# Patient Record
Sex: MALE | Race: BLACK OR AFRICAN AMERICAN | Employment: UNEMPLOYED | URBAN - METROPOLITAN AREA
[De-identification: names, ages, dates, MRNs, and addresses within clinical notes are randomized per-mention and may not be internally consistent; named-entity substitution may affect disease eponyms.]

---

## 2020-09-23 ENCOUNTER — VIRTUAL VISIT (OUTPATIENT)
Dept: FAMILY MEDICINE CLINIC | Age: 50
End: 2020-09-23
Payer: COMMERCIAL

## 2020-09-23 DIAGNOSIS — E11.65 TYPE 2 DIABETES MELLITUS WITH HYPERGLYCEMIA, WITHOUT LONG-TERM CURRENT USE OF INSULIN (HCC): Primary | ICD-10-CM

## 2020-09-23 DIAGNOSIS — E03.9 HYPOTHYROIDISM, UNSPECIFIED TYPE: ICD-10-CM

## 2020-09-23 PROCEDURE — 99202 OFFICE O/P NEW SF 15 MIN: CPT | Performed by: FAMILY MEDICINE

## 2020-09-23 RX ORDER — METFORMIN HYDROCHLORIDE 500 MG/1
500 TABLET ORAL
COMMUNITY
End: 2020-09-23 | Stop reason: SDUPTHER

## 2020-09-23 RX ORDER — LEVOTHYROXINE SODIUM 150 UG/1
150 TABLET ORAL
Qty: 30 TAB | Refills: 2 | Status: SHIPPED | OUTPATIENT
Start: 2020-09-23 | End: 2021-02-23 | Stop reason: SDUPTHER

## 2020-09-23 RX ORDER — LEVOTHYROXINE SODIUM 150 UG/1
150 TABLET ORAL
COMMUNITY
End: 2020-09-23 | Stop reason: SDUPTHER

## 2020-09-23 RX ORDER — METFORMIN HYDROCHLORIDE 500 MG/1
500 TABLET ORAL
Qty: 30 TAB | Refills: 2 | Status: SHIPPED | OUTPATIENT
Start: 2020-09-23 | End: 2021-01-11

## 2020-09-23 NOTE — PROGRESS NOTES
Brandie Yarbrough is a 48 y.o. male who was seen by synchronous (real-time) audio-video technology on 9/23/2020 for Diabetes (he is establishing care. he has no complaints. he is recent out of [facility]. ) and Thyroid Problem        Assessment & Plan:   Diagnoses and all orders for this visit:    1. Type 2 diabetes mellitus with hyperglycemia, without long-term current use of insulin (HCC)  -     metFORMIN (GLUCOPHAGE) 500 mg tablet; Take 1 Tab by mouth daily (with breakfast). Indications: type 2 diabetes mellitus  -     HEMOGLOBIN A1C WITH EAG; Future  -     METABOLIC PANEL, COMPREHENSIVE; Future  -     CBC WITH AUTOMATED DIFF; Future  -     LIPID PANEL; Future    2. Hypothyroidism, unspecified type  -     levothyroxine (SYNTHROID) 150 mcg tablet; Take 1 Tab by mouth Daily (before breakfast). -     TSH AND FREE T4; Future          712  Subjective:       Prior to Admission medications    Medication Sig Start Date End Date Taking? Authorizing Provider   levothyroxine (SYNTHROID) 150 mcg tablet Take 150 mcg by mouth Daily (before breakfast). Yes Provider, Historical   metFORMIN (GLUCOPHAGE) 500 mg tablet Take 500 mg by mouth daily (with breakfast). Indications: type 2 diabetes mellitus   Yes Provider, Historical     There is no problem list on file for this patient. Past Medical History:   Diagnosis Date    Acquired underactive thyroid     DM (diabetes mellitus), type 2 ([facility] [address]        Review of Systems   Constitutional: Negative for chills, fever, malaise/fatigue and weight loss. Eyes: Negative for blurred vision. Respiratory: Negative for shortness of breath and wheezing. Cardiovascular: Negative for chest pain. Gastrointestinal: Negative for nausea and vomiting. Musculoskeletal: Negative for myalgias. Skin: Negative for rash. Neurological: Negative for weakness. Objective:   No flowsheet data found.    General: alert, cooperative, no distress   Mental  status: normal mood, behavior, speech, dress, motor activity, and thought processes, able to follow commands   HENT: NCAT   Neck: no visualized mass   Resp: no respiratory distress   Neuro: no gross deficits   Skin: no discoloration or lesions of concern on visible areas   Psychiatric: normal affect, consistent with stated mood, no evidence of hallucinations     Additional exam findings: We discussed the expected course, resolution and complications of the diagnosis(es) in detail. Medication risks, benefits, costs, interactions, and alternatives were discussed as indicated. I advised him to contact the office if his condition worsens, changes or fails to improve as anticipated. He expressed understanding with the diagnosis(es) and plan. Griselda Coward, who was evaluated through a patient-initiated, synchronous (real-time) audio-video encounter, and/or his healthcare decision maker, is aware that it is a billable service, with coverage as determined by his insurance carrier. He provided verbal consent to proceed: Yes, and patient identification was verified. It was conducted pursuant to the emergency declaration under the [address] [place] [address] authority and the [employer] General Act. A caregiver was present when appropriate. Ability to conduct physical exam was limited. I was in the office. The patient was at home.       Roberto Kimball MD

## 2020-09-23 NOTE — PROGRESS NOTES
Pt is a virtual visit for DM and thyroid. Hx of DM2 and hypothyroidism. Pt needs refills. meds reviewed and uploaded. Allergies, hx, questionnaires, hm all reviewed with patient.

## 2021-01-11 DIAGNOSIS — E11.65 TYPE 2 DIABETES MELLITUS WITH HYPERGLYCEMIA, WITHOUT LONG-TERM CURRENT USE OF INSULIN (HCC): ICD-10-CM

## 2021-01-11 RX ORDER — METFORMIN HYDROCHLORIDE 500 MG/1
TABLET ORAL
Qty: 30 TAB | Refills: 0 | Status: SHIPPED | OUTPATIENT
Start: 2021-01-11 | End: 2021-02-23 | Stop reason: SDUPTHER

## 2021-02-18 ENCOUNTER — TELEPHONE (OUTPATIENT)
Dept: FAMILY MEDICINE CLINIC | Age: 51
End: 2021-02-18

## 2021-02-18 NOTE — TELEPHONE ENCOUNTER
I called patient b/c he is overdue for labs and f/u. He states he has moved to [facility]. He was given the # to [facility] to f/u with them which is much closer to him.

## 2021-02-23 ENCOUNTER — VIRTUAL VISIT (OUTPATIENT)
Dept: FAMILY MEDICINE CLINIC | Age: 51
End: 2021-02-23
Payer: COMMERCIAL

## 2021-02-23 DIAGNOSIS — E03.9 HYPOTHYROIDISM, UNSPECIFIED TYPE: ICD-10-CM

## 2021-02-23 DIAGNOSIS — E11.65 TYPE 2 DIABETES MELLITUS WITH HYPERGLYCEMIA, WITHOUT LONG-TERM CURRENT USE OF INSULIN (HCC): ICD-10-CM

## 2021-02-23 PROCEDURE — 99212 OFFICE O/P EST SF 10 MIN: CPT | Performed by: FAMILY MEDICINE

## 2021-02-23 RX ORDER — METFORMIN HYDROCHLORIDE 500 MG/1
TABLET ORAL
Qty: 30 TAB | Refills: 5 | Status: SHIPPED | OUTPATIENT
Start: 2021-02-23 | End: 2021-05-24 | Stop reason: SDUPTHER

## 2021-02-23 RX ORDER — LEVOTHYROXINE SODIUM 150 UG/1
150 TABLET ORAL
Qty: 30 TAB | Refills: 5 | Status: SHIPPED | OUTPATIENT
Start: 2021-02-23 | End: 2021-05-24 | Stop reason: SDUPTHER

## 2021-02-23 NOTE — PROGRESS NOTES
Chief Complaint   Patient presents with    Thyroid Problem    Diabetes     Pt VV for chronic med conditions and med refills. 1. Have you been to the ER, urgent care clinic since your last visit? Hospitalized since your last visit? No    2. Have you seen or consulted any other health care providers outside of the [address] [place] [address] since your last visit? Include any pap smears or colon screening.  No

## 2021-02-23 NOTE — PROGRESS NOTES
Jeannine Mae is a 48 y.o. male, evaluated via audio-only technology on 2/23/2021 for Thyroid Problem and Diabetes  . Assessment & Plan:   Diagnoses and all orders for this visit:    1. Type 2 diabetes mellitus with hyperglycemia, without long-term current use of insulin (HCC)  -     metFORMIN (GLUCOPHAGE) 500 mg tablet; Take 1 tablet by mouth once daily with breakfast    2. Hypothyroidism, unspecified type  -     levothyroxine (SYNTHROID) 150 mcg tablet; Take 1 Tab by mouth Daily (before breakfast). 12  Subjective:       Prior to Admission medications    Medication Sig Start Date End Date Taking? Authorizing Provider   metFORMIN (GLUCOPHAGE) 500 mg tablet Take 1 tablet by mouth once daily with breakfast 2/23/21  Yes Kam Patterson MD   levothyroxine (SYNTHROID) 150 mcg tablet Take 1 Tab by mouth Daily (before breakfast). 2/23/21  Yes Kam Patterson MD   metFORMIN (GLUCOPHAGE) 500 mg tablet Take 1 tablet by mouth once daily with breakfast 1/11/21 2/23/21  Kam Patterson MD   levothyroxine (SYNTHROID) 150 mcg tablet Take 1 Tab by mouth Daily (before breakfast). 9/23/20 2/23/21  Kam Patterson MD     There is no problem list on file for this patient. Past Medical History:   Diagnosis Date    Acquired underactive thyroid     DM (diabetes mellitus), type 2 ([facility] [address]        Review of Systems   Eyes: Negative for blurred vision and double vision. Respiratory: Negative for cough and shortness of breath. Cardiovascular: Negative for chest pain and palpitations. Neurological: Negative. Psychiatric/Behavioral: Negative. No flowsheet data found. Jeannine Mae, who was evaluated through a patient-initiated, synchronous (real-time) audio only encounter, and/or her healthcare decision maker, is aware that it is a billable service, with coverage as determined by his insurance carrier. He provided verbal consent to proceed: Yes.  He has not had a related appointment within my department in the past 7 days or scheduled within the next 24 hours.       Total Time: minutes: 11-20 minutes    Jenelle Ellis MD

## 2021-05-24 ENCOUNTER — VIRTUAL VISIT (OUTPATIENT)
Dept: FAMILY MEDICINE CLINIC | Age: 51
End: 2021-05-24

## 2021-05-24 DIAGNOSIS — E03.9 HYPOTHYROIDISM, UNSPECIFIED TYPE: ICD-10-CM

## 2021-05-24 DIAGNOSIS — E11.65 TYPE 2 DIABETES MELLITUS WITH HYPERGLYCEMIA, WITHOUT LONG-TERM CURRENT USE OF INSULIN (HCC): Primary | ICD-10-CM

## 2021-05-24 DIAGNOSIS — Z12.5 PROSTATE CANCER SCREENING: ICD-10-CM

## 2021-05-24 PROCEDURE — 99214 OFFICE O/P EST MOD 30 MIN: CPT | Performed by: FAMILY MEDICINE

## 2021-05-24 RX ORDER — METFORMIN HYDROCHLORIDE 500 MG/1
TABLET ORAL
Qty: 30 TABLET | Refills: 0 | Status: SHIPPED | OUTPATIENT
Start: 2021-05-24 | End: 2022-01-11 | Stop reason: SDUPTHER

## 2021-05-24 RX ORDER — LEVOTHYROXINE SODIUM 150 UG/1
150 TABLET ORAL
Qty: 30 TABLET | Refills: 0 | Status: SHIPPED | OUTPATIENT
Start: 2021-05-24 | End: 2021-11-15

## 2021-05-24 NOTE — PROGRESS NOTES
Chief Complaint   Patient presents with    Diabetes    Thyroid Problem     Pt f/u for chronic conditions. Needs med refills. 1. Have you been to the ER, urgent care clinic since your last visit? Hospitalized since your last visit? No    2. Have you seen or consulted any other health care providers outside of the [address] [place] [address] since your last visit? Include any pap smears or colon screening.  No

## 2021-05-24 NOTE — PROGRESS NOTES
Rehan Schroeder is a 48 y.o. male who was seen by synchronous (real-time) audio-video technology on 5/24/2021 for Diabetes and Thyroid Problem  needs refills. No new complaints  Needs labs. Assessment & Plan:   Diagnoses and all orders for this visit:    1. Type 2 diabetes mellitus with hyperglycemia, without long-term current use of insulin (HCC)  -     metFORMIN (GLUCOPHAGE) 500 mg tablet; Take 1 tablet by mouth once daily with breakfast  -     LIPID PANEL; Future  -     METABOLIC PANEL, COMPREHENSIVE; Future  -     CBC WITH AUTOMATED DIFF; Future    2. Hypothyroidism, unspecified type  -     levothyroxine (SYNTHROID) 150 mcg tablet; Take 1 Tablet by mouth Daily (before breakfast). -     TSH AND FREE T4; Future    3. Prostate cancer screening  -     PSA SCREENING (SCREENING); Future          712  Subjective:       Prior to Admission medications    Medication Sig Start Date End Date Taking? Authorizing Provider   metFORMIN (GLUCOPHAGE) 500 mg tablet Take 1 tablet by mouth once daily with breakfast 2/23/21   Nicky Harry MD   levothyroxine (SYNTHROID) 150 mcg tablet Take 1 Tab by mouth Daily (before breakfast). 2/23/21   Nicky Harry MD     There is no problem list on file for this patient. Past Medical History:   Diagnosis Date    Acquired underactive thyroid     DM (diabetes mellitus), type 2 ([facility] [address]        Review of Systems   Constitutional: Negative for chills, fever, malaise/fatigue and weight loss. Eyes: Negative for blurred vision. Respiratory: Negative for cough, shortness of breath and wheezing. Cardiovascular: Negative for chest pain. Gastrointestinal: Negative for nausea and vomiting. Musculoskeletal: Negative for myalgias. Skin: Negative for rash. Neurological: Negative for weakness. Objective:   No flowsheet data found.    General: alert, cooperative, no distress   Mental  status: normal mood, behavior, speech, dress, motor activity, and thought processes, able to follow commands   HENT: NCAT   Neck: no visualized mass   Resp: no respiratory distress   Neuro: no gross deficits   Skin: no discoloration or lesions of concern on visible areas   Psychiatric: normal affect, consistent with stated mood, no evidence of hallucinations     Additional exam findings: We discussed the expected course, resolution and complications of the diagnosis(es) in detail. Medication risks, benefits, costs, interactions, and alternatives were discussed as indicated. I advised him to contact the office if his condition worsens, changes or fails to improve as anticipated. He expressed understanding with the diagnosis(es) and plan. Chung Claros, was evaluated through a synchronous (real-time) audio-video encounter. The patient (or guardian if applicable) is aware that this is a billable service. Verbal consent to proceed has been obtained within the past 12 months. The visit was conducted pursuant to the emergency declaration under the [address] [place] [address] authority and the [employer] General Act. Patient identification was verified, and a caregiver was present when appropriate. The patient was located in a state where the provider was credentialed to provide care.     Nikki Reece MD

## 2022-01-05 DIAGNOSIS — E11.65 TYPE 2 DIABETES MELLITUS WITH HYPERGLYCEMIA, WITHOUT LONG-TERM CURRENT USE OF INSULIN (HCC): ICD-10-CM

## 2022-01-05 DIAGNOSIS — E03.9 HYPOTHYROIDISM, UNSPECIFIED TYPE: ICD-10-CM

## 2022-01-06 RX ORDER — METFORMIN HYDROCHLORIDE 500 MG/1
TABLET ORAL
Qty: 90 TABLET | Refills: 0 | OUTPATIENT
Start: 2022-01-06

## 2022-01-06 RX ORDER — LEVOTHYROXINE SODIUM 150 UG/1
TABLET ORAL
Qty: 30 TABLET | Refills: 0 | OUTPATIENT
Start: 2022-01-06

## 2022-01-07 ENCOUNTER — TELEPHONE (OUTPATIENT)
Dept: FAMILY MEDICINE CLINIC | Age: 52
End: 2022-01-07

## 2022-01-07 NOTE — TELEPHONE ENCOUNTER
Attempted to call patient regarding his F/u appointment, left message on patient voice-mail to return call the office.

## 2022-01-10 NOTE — TELEPHONE ENCOUNTER
Patient called back and I made him aware that he is overdue for follow up. He scheduled with me for next Monday, a virtual, he states his schedule only allows for a virtual.  He is completely out so is wondering if and interval supply of both Metformin and Levothyroxine can be sent in. Wouldn't let me pend them.

## 2022-01-11 DIAGNOSIS — E11.65 TYPE 2 DIABETES MELLITUS WITH HYPERGLYCEMIA, WITHOUT LONG-TERM CURRENT USE OF INSULIN (HCC): ICD-10-CM

## 2022-01-11 DIAGNOSIS — E03.9 HYPOTHYROIDISM, UNSPECIFIED TYPE: ICD-10-CM

## 2022-01-11 RX ORDER — LEVOTHYROXINE SODIUM 150 UG/1
150 TABLET ORAL
Qty: 15 TABLET | Refills: 0 | Status: SHIPPED | OUTPATIENT
Start: 2022-01-11 | End: 2022-01-17 | Stop reason: SDUPTHER

## 2022-01-11 RX ORDER — METFORMIN HYDROCHLORIDE 500 MG/1
TABLET ORAL
Qty: 15 TABLET | Refills: 0 | Status: SHIPPED | OUTPATIENT
Start: 2022-01-11 | End: 2022-01-17 | Stop reason: SDUPTHER

## 2022-01-17 ENCOUNTER — TELEPHONE (OUTPATIENT)
Dept: FAMILY MEDICINE CLINIC | Age: 52
End: 2022-01-17

## 2022-01-17 ENCOUNTER — VIRTUAL VISIT (OUTPATIENT)
Dept: FAMILY MEDICINE CLINIC | Age: 52
End: 2022-01-17
Payer: COMMERCIAL

## 2022-01-17 DIAGNOSIS — E03.9 HYPOTHYROIDISM, UNSPECIFIED TYPE: ICD-10-CM

## 2022-01-17 DIAGNOSIS — E11.65 TYPE 2 DIABETES MELLITUS WITH HYPERGLYCEMIA, WITHOUT LONG-TERM CURRENT USE OF INSULIN (HCC): ICD-10-CM

## 2022-01-17 PROCEDURE — 99213 OFFICE O/P EST LOW 20 MIN: CPT | Performed by: FAMILY MEDICINE

## 2022-01-17 RX ORDER — LEVOTHYROXINE SODIUM 150 UG/1
150 TABLET ORAL
Qty: 15 TABLET | Refills: 0 | Status: SHIPPED | OUTPATIENT
Start: 2022-01-17 | End: 2022-02-23 | Stop reason: SDUPTHER

## 2022-01-17 RX ORDER — METFORMIN HYDROCHLORIDE 500 MG/1
TABLET ORAL
Qty: 15 TABLET | Refills: 0 | Status: SHIPPED | OUTPATIENT
Start: 2022-01-17 | End: 2022-02-23 | Stop reason: SDUPTHER

## 2022-01-17 NOTE — PROGRESS NOTES
Patient being seen today for follow up and refills on his medications. No other concerns. 1. \"Have you been to the ER, urgent care clinic since your last visit? Hospitalized since your last visit? \" No    2. \"Have you seen or consulted any other health care providers outside of the [address] [place] [address] since your last visit? \" No     3. For patients aged 39-70: Has the patient had a colonoscopy / FIT/ Cologuard? No      If the patient is female:    4. For patients aged 41-77: Has the patient had a mammogram within the past 2 years? NA - based on age or sex  See top three    5. For patients aged 21-65: Has the patient had a pap smear?  NA - based on age or sex

## 2022-01-17 NOTE — PROGRESS NOTES
Adrian Brown is a 46 y.o. male who was seen by synchronous (real-time) audio-video technology on 1/17/2022 for Thyroid Problem (needs refills and labs. no new complaints. ) and Diabetes        Assessment & Plan:   Diagnoses and all orders for this visit:    1. Hypothyroidism, unspecified type  -     levothyroxine (Euthyrox) 150 mcg tablet; Take 1 Tablet by mouth Daily (before breakfast). 2. Type 2 diabetes mellitus with hyperglycemia, without long-term current use of insulin (HCC)  -     metFORMIN (GLUCOPHAGE) 500 mg tablet; Take 1 tablet by mouth once daily with breakfast  -     HEMOGLOBIN A1C WITH EAG; Future          712  Subjective:       Prior to Admission medications    Medication Sig Start Date End Date Taking? Authorizing Provider   levothyroxine (Euthyrox) 150 mcg tablet Take 1 Tablet by mouth Daily (before breakfast). 1/11/22  Yes Tricia Jeffries MD   metFORMIN (GLUCOPHAGE) 500 mg tablet Take 1 tablet by mouth once daily with breakfast 1/11/22  Yes Tricia Jeffries MD     There is no problem list on file for this patient. Past Medical History:   Diagnosis Date    Acquired underactive thyroid     DM (diabetes mellitus), type 2 ([facility] [address]        Review of Systems   Constitutional: Negative for chills, fever, malaise/fatigue and weight loss. Eyes: Negative for blurred vision. Respiratory: Negative for cough, shortness of breath and wheezing. Cardiovascular: Negative for chest pain. Gastrointestinal: Negative for nausea and vomiting. Musculoskeletal: Negative for myalgias. Skin: Negative for rash. Neurological: Negative for weakness. Objective:   No flowsheet data found.    General: alert, cooperative, no distress   Mental  status: normal mood, behavior, speech, dress, motor activity, and thought processes, able to follow commands   HENT: NCAT   Neck: no visualized mass   Resp: no respiratory distress   Neuro: no gross deficits   Skin: no discoloration or lesions of concern on visible areas   Psychiatric: normal affect, consistent with stated mood, no evidence of hallucinations     Additional exam findings: We discussed the expected course, resolution and complications of the diagnosis(es) in detail. Medication risks, benefits, costs, interactions, and alternatives were discussed as indicated. I advised him to contact the office if his condition worsens, changes or fails to improve as anticipated. He expressed understanding with the diagnosis(es) and plan. Rianamonique Bhagat was evaluated through a synchronous (real-time) audio-video encounter. The patient (or guardian if applicable) is aware that this is a billable service. Verbal consent to proceed has been obtained within the past 12 months. The visit was conducted pursuant to the emergency declaration under the [address] [place] [address] authority and the [employer] and [employer] General Act. Patient identification was verified, and a caregiver was present when appropriate. The patient was located in a state where the provider was credentialed to provide care.     Jacob Rubi MD

## 2022-01-17 NOTE — TELEPHONE ENCOUNTER
Called patient to get him ready for his 11:30 VV no answer left message asking him to call the office back before 11:30 to complete his VV. Office number has been left.

## 2022-02-23 ENCOUNTER — VIRTUAL VISIT (OUTPATIENT)
Dept: FAMILY MEDICINE CLINIC | Age: 52
End: 2022-02-23
Payer: COMMERCIAL

## 2022-02-23 DIAGNOSIS — E03.9 HYPOTHYROIDISM, UNSPECIFIED TYPE: ICD-10-CM

## 2022-02-23 DIAGNOSIS — E11.65 TYPE 2 DIABETES MELLITUS WITH HYPERGLYCEMIA, WITHOUT LONG-TERM CURRENT USE OF INSULIN (HCC): ICD-10-CM

## 2022-02-23 PROCEDURE — 99213 OFFICE O/P EST LOW 20 MIN: CPT | Performed by: FAMILY MEDICINE

## 2022-02-23 RX ORDER — METFORMIN HYDROCHLORIDE 500 MG/1
TABLET ORAL
Qty: 30 TABLET | Refills: 5 | Status: SHIPPED | OUTPATIENT
Start: 2022-02-23 | End: 2022-09-28

## 2022-02-23 RX ORDER — LEVOTHYROXINE SODIUM 150 UG/1
150 TABLET ORAL
Qty: 30 TABLET | Refills: 5 | Status: SHIPPED | OUTPATIENT
Start: 2022-02-23 | End: 2022-09-28

## 2022-02-23 NOTE — PROGRESS NOTES
Chief Complaint   Patient presents with    Thyroid Problem    Diabetes     Pt presents for thyroid and DM f/u.

## 2022-02-23 NOTE — PROGRESS NOTES
Meryle Nay is a 46 y.o. male who was seen by synchronous (real-time) audio-video technology on 2/23/2022 for Thyroid Problem and Diabetes        Assessment & Plan:   Diagnoses and all orders for this visit:    1. Hypothyroidism, unspecified type  -     levothyroxine (Euthyrox) 150 mcg tablet; Take 1 Tablet by mouth Daily (before breakfast). 2. Type 2 diabetes mellitus with hyperglycemia, without long-term current use of insulin (HCC)  -     metFORMIN (GLUCOPHAGE) 500 mg tablet; Take 1 tablet by mouth once daily with breakfast      Labs were done at [facility]    712  Subjective:       Prior to Admission medications    Medication Sig Start Date End Date Taking? Authorizing Provider   levothyroxine (Euthyrox) 150 mcg tablet Take 1 Tablet by mouth Daily (before breakfast). 1/17/22   Naya Nunez MD   metFORMIN (GLUCOPHAGE) 500 mg tablet Take 1 tablet by mouth once daily with breakfast 1/17/22   Naya Nunez MD     There is no problem list on file for this patient. Past Medical History:   Diagnosis Date    Acquired underactive thyroid     DM (diabetes mellitus), type 2 ([facility] [address]        Review of Systems   Constitutional: Negative for chills, fever, malaise/fatigue and weight loss. Eyes: Negative for blurred vision. Respiratory: Negative for cough, shortness of breath and wheezing. Cardiovascular: Negative for chest pain. Gastrointestinal: Negative for nausea and vomiting. Musculoskeletal: Negative for myalgias. Skin: Negative for rash. Neurological: Negative for weakness. Psychiatric/Behavioral: Negative. Objective:   No flowsheet data found.    General: alert, cooperative, no distress   Mental  status: normal mood, behavior, speech, dress, motor activity, and thought processes, able to follow commands   HENT: NCAT   Neck: no visualized mass   Resp: no respiratory distress   Neuro: no gross deficits   Skin: no discoloration or lesions of concern on visible areas   Psychiatric: normal affect, consistent with stated mood, no evidence of hallucinations     Additional exam findings: We discussed the expected course, resolution and complications of the diagnosis(es) in detail. Medication risks, benefits, costs, interactions, and alternatives were discussed as indicated. I advised him to contact the office if his condition worsens, changes or fails to improve as anticipated. He expressed understanding with the diagnosis(es) and plan. Lisseth Meyer, was evaluated through a synchronous (real-time) audio-video encounter. The patient (or guardian if applicable) is aware that this is a billable service, which includes applicable co-pays. Verbal consent to proceed has been obtained. The visit was conducted pursuant to the emergency declaration under the [facility], [address] [place] [address] authority and the [employer] General Act. Patient identification was verified, and a caregiver was present when appropriate. The patient was located at home in a state where the provider was licensed to provide care.     Connie Patel MD

## 2022-07-06 ENCOUNTER — VIRTUAL VISIT (OUTPATIENT)
Dept: FAMILY MEDICINE CLINIC | Age: 52
End: 2022-07-06
Payer: COMMERCIAL

## 2022-07-06 DIAGNOSIS — M54.40 LOW BACK PAIN WITH SCIATICA, SCIATICA LATERALITY UNSPECIFIED, UNSPECIFIED BACK PAIN LATERALITY, UNSPECIFIED CHRONICITY: Primary | ICD-10-CM

## 2022-07-06 PROCEDURE — 99213 OFFICE O/P EST LOW 20 MIN: CPT | Performed by: FAMILY MEDICINE

## 2022-07-06 NOTE — LETTER
NOTIFICATION RETURN TO WORK     7/6/2022 4:28 PM    Mr. Carleen Bush  [address] [place] [address]      To Whom It May Concern:    Carleen Bush is currently under the care of [facility]. He will return to work/school on: 7/7/22 but limited duty. If there are questions or concerns please have the patient contact our office.         Sincerely,      Cabrera Caotes MD

## 2022-07-06 NOTE — PROGRESS NOTES
Ingrid Dumont is a 46 y.o. male who was seen by synchronous (real-time) audio-video technology on 7/6/2022 for Leg Pain (he was lifting person in wheel chair. he states that pain goes down leg.  )        Assessment & Plan:   Diagnoses and all orders for this visit:    1. Low back pain with sciatica, sciatica laterality unspecified, unspecified back pain laterality, unspecified chronicity  -     MRI LUMB SPINE W WO CONT; Future    will continue meds from Er. Letter written for work restriction. 712  Subjective:       Prior to Admission medications    Medication Sig Start Date End Date Taking? Authorizing Provider   levothyroxine (Euthyrox) 150 mcg tablet Take 1 Tablet by mouth Daily (before breakfast). 2/23/22  Yes Chay Astorga MD   metFORMIN (GLUCOPHAGE) 500 mg tablet Take 1 tablet by mouth once daily with breakfast 2/23/22  Yes Chay Astorga MD     There is no problem list on file for this patient. Past Medical History:   Diagnosis Date    Acquired underactive thyroid     DM (diabetes mellitus), type 2 (HCC)        ROS    Objective:   No flowsheet data found. General: alert, cooperative, no distress   Mental  status: normal mood, behavior, speech, dress, motor activity, and thought processes, able to follow commands   HENT: NCAT   Neck: no visualized mass   Resp: no respiratory distress   Neuro: no gross deficits   Skin: no discoloration or lesions of concern on visible areas   Psychiatric: normal affect, consistent with stated mood, no evidence of hallucinations     Additional exam findings: We discussed the expected course, resolution and complications of the diagnosis(es) in detail. Medication risks, benefits, costs, interactions, and alternatives were discussed as indicated. I advised him to contact the office if his condition worsens, changes or fails to improve as anticipated. He expressed understanding with the diagnosis(es) and plan.      Ingrid Dumont, was evaluated through a synchronous (real-time) audio-video encounter. The patient (or guardian if applicable) is aware that this is a billable service, which includes applicable co-pays. This Virtual Visit was conducted with patient's (and/or legal guardian's) consent. The visit was conducted pursuant to the emergency declaration under the [facility], [address] [place] [address] authority and the [employer] and [employer] General Act. Patient identification was verified, and a caregiver was present when appropriate. The patient was located at: Home: [address]  The provider was located at:  Facility (Appt Department): [address] [place] [address]  [phone]        Pop Toney MD

## 2022-07-06 NOTE — PROGRESS NOTES
Chief Complaint   Patient presents with    Leg Pain       Pt was seen in [facility] ED on 7/1 for bilat leg pain. Pt was given lidocaine patches, steroids, and muscle relaxer.

## 2022-07-13 ENCOUNTER — TELEPHONE (OUTPATIENT)
Dept: FAMILY MEDICINE CLINIC | Age: 52
End: 2022-07-13

## 2022-07-13 DIAGNOSIS — M54.40 LOW BACK PAIN WITH SCIATICA, SCIATICA LATERALITY UNSPECIFIED, UNSPECIFIED BACK PAIN LATERALITY, UNSPECIFIED CHRONICITY: Primary | ICD-10-CM

## 2022-07-13 RX ORDER — LIDOCAINE 50 MG/G
1 PATCH TOPICAL EVERY 24 HOURS
Qty: 30 EACH | Refills: 1 | Status: CANCELLED | OUTPATIENT
Start: 2022-07-13

## 2022-07-13 RX ORDER — CYCLOBENZAPRINE HCL 10 MG
10 TABLET ORAL
Qty: 60 TABLET | Refills: 1 | Status: CANCELLED | OUTPATIENT
Start: 2022-07-13

## 2022-07-13 NOTE — TELEPHONE ENCOUNTER
Pt called to request medication prescribed by ER (LIDOCAINE PATCHES, CYCLOBENZAPRIL). Pt states he has run out of the medication on 7/12 and is in extreme pain. Pt would only need a supply to last until his MRI appointment on 8/11. Please review and advise as soon as possible.

## 2022-07-22 DIAGNOSIS — M54.40 LOW BACK PAIN WITH SCIATICA, SCIATICA LATERALITY UNSPECIFIED, UNSPECIFIED BACK PAIN LATERALITY, UNSPECIFIED CHRONICITY: Primary | ICD-10-CM

## 2022-07-22 RX ORDER — METHOCARBAMOL 500 MG/1
500 TABLET, FILM COATED ORAL
Qty: 40 TABLET | Refills: 3 | Status: SHIPPED | OUTPATIENT
Start: 2022-07-22

## 2022-07-22 RX ORDER — LIDOCAINE 4 G/100G
PATCH TOPICAL
Qty: 30 EACH | Refills: 3 | Status: SHIPPED | OUTPATIENT
Start: 2022-07-22

## 2022-07-25 NOTE — TELEPHONE ENCOUNTER
Meds have been sent in by Dr. Leonardo Pan. Called to let pt know. Left message for patient at home number requesting return call.

## 2022-08-11 ENCOUNTER — HOSPITAL ENCOUNTER (OUTPATIENT)
Dept: MRI IMAGING | Age: 52
Discharge: HOME | End: 2022-08-11
Attending: FAMILY MEDICINE
Payer: COMMERCIAL

## 2022-08-11 DIAGNOSIS — M54.40 LOW BACK PAIN WITH SCIATICA, SCIATICA LATERALITY UNSPECIFIED, UNSPECIFIED BACK PAIN LATERALITY, UNSPECIFIED CHRONICITY: ICD-10-CM

## 2022-08-11 PROCEDURE — 72158 MRI LUMBAR SPINE W/O & W/DYE: CPT

## 2022-08-11 PROCEDURE — A9575 INJ GADOTERATE MEGLUMI 0.1ML: HCPCS | Performed by: FAMILY MEDICINE

## 2022-08-11 PROCEDURE — 74011250636 HC RX REV CODE- 250/636: Performed by: FAMILY MEDICINE

## 2022-08-11 RX ADMIN — GADOTERATE MEGLUMINE 15 ML: 376.9 INJECTION INTRAVENOUS at 14:42

## 2022-08-17 ENCOUNTER — TELEPHONE (OUTPATIENT)
Dept: FAMILY MEDICINE CLINIC | Age: 52
End: 2022-08-17

## 2022-08-17 DIAGNOSIS — M51.26 HERNIATED LUMBAR INTERVERTEBRAL DISC: Primary | ICD-10-CM

## 2022-08-17 DIAGNOSIS — M51.26 LUMBAR HERNIATED DISC: Primary | ICD-10-CM

## 2022-08-17 NOTE — TELEPHONE ENCOUNTER
Pt was wanting to know if the can have a work excuse until he sees the specialist d/t his herniated disc. Please advise.

## 2022-09-06 NOTE — TELEPHONE ENCOUNTER
Called pt to see if he still needed note. No answer. Left message for patient at home number requesting return call.

## 2022-09-06 NOTE — TELEPHONE ENCOUNTER
Late entry:      Called pt last week to see if he still needed work note. Left message for patient at home number requesting return call.

## 2022-09-13 NOTE — PROGRESS NOTES
Pt was made aware of his results on 8/17.  He does know about his referral and is scheduled to see spine on 9/26

## 2022-09-22 NOTE — PROGRESS NOTES
[facility]  [address] [place], [facility] [address]  Phone: [phone]  Fax: [phone]    NEW PATIENT  Pt's YOB: 1970    ASSESSMENT   Diagnoses and all orders for this visit:    1. Annular tear of lumbar disc  -     pregabalin (Lyrica) 50 mg capsule; Take 1 cap by mouth in the morning and 2 at night as directed. -     REFERRAL TO PHYSICAL THERAPY  -     methylPREDNISolone (MEDROL DOSEPACK) 4 mg tablet; Per dose pack instructions    2. Lumbar radiculitis  -     pregabalin (Lyrica) 50 mg capsule; Take 1 cap by mouth in the morning and 2 at night as directed. -     REFERRAL TO PHYSICAL THERAPY  -     methylPREDNISolone (MEDROL DOSEPACK) 4 mg tablet; Per dose pack instructions    3. Muscle spasm  -     REFERRAL TO PHYSICAL THERAPY    4. Foraminal stenosis of lumbar region  -     REFERRAL TO PHYSICAL THERAPY    5. Tobacco use       IMPRESSION AND PLAN:  Nii Houston is a 46 y.o. left hand dominant male with history of lumbar and leg pain x worsening pain for about 6 month. Pt presents to the office today as a new patient referred by Davis Burnett MD and complain of pain in the lumbar region radiating down both the legs that was incited by an incident 6 months ago. He is taking Robaxin 500 mg 1 tab BID as needed for muscle spasms with no relief. 1) Pt was given information on lumbar arthritis and herniated disc exercises. 2) Discussed treatment options with the patient including physical therapy, steroid injections, and medication evaluation. 3) Lumbar spine MRI from 08/11/2022 was reviewed with the patient at today's visit. 4) Smoking cessation recommended. 5) He was prescribed Lyrica 50 mg 1 cap QAM and 2 caps QHS as directed. 6) . [facility] has a reminder for a \"due or due soon\" health maintenance. I have asked that he contact his primary care provider, Davis Burnett MD, for follow-up on this health maintenance.   7)  demonstrated consistency with prescribing. 8) Pt was referred to physical therapy for strengthening, ROM, and modalities. 9) A Medrol Dosepak was prescribed for acute inflammatory pain. Follow-up and Dispositions    Return in about 6 weeks (around 11/7/2022) for Medication follow up, Diagnostic Test follow up. HISTORY OF PRESENT ILLNESS:  Karen Cummings is a 46 y.o. left hand dominant male with history of lumbar and leg pain x worsening pain for about 6 month. Pt presents to the office today as a new patient referred by Armen Reynoso MD and complains of pain in the lumbar region radiating down both the legs that was incited by an incident 6 months ago. He reports bending forwards to help lift his friend out of a wheelchair and down stairs when he felt a 'pop' in his back and has had pain in his back since. He further notes his back has been aching since an incident while working in [employer] over 2 years ago. Pt admits to pain in both the legs with varying pain between the left and the right and he does note loss of balance. He denies loss of bowel or bladder control but does admits to weakness in the legs and the arms. Pt is taking Robaxin 500 mg 1 tab BID and Flexeril  as needed for muscle spasms with no relief and intense sedation with Flexeril. He denies every taking prednisone or steroids. He smokes less than 1 ppd of cigarettes and denies history of glaucoma or kidney stones. Pt at this time desires to proceed with physical therapy and medication evaluation. 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      Pain Scale: 10 - Worst pain ever/10     PCP: Armen Reynoso MD    Past Medical History:   Diagnosis Date    Acquired underactive thyroid     DM (diabetes mellitus), type 2 ([address]         Social History     Socioeconomic History    Marital status: SINGLE Spouse name: Not on file    Number of children: Not on file    Years of education: Not on file    Highest education level: Not on file   Occupational History    Not on file   Tobacco Use    Smoking status: Every Day     Types: Cigars    Smokeless tobacco: Never   Substance and Sexual Activity    Alcohol use: Yes    Drug use: Never    Sexual activity: Not Currently   Other Topics Concern    Not on file   Social History Narrative    Not on file     Social Determinants of Health     Financial Resource Strain: Not on file   Food Insecurity: Not on file   Transportation Needs: Not on file   Physical Activity: Not on file   Stress: Not on file   Social Connections: Not on file   Intimate Partner Violence: Not on file   Housing Stability: Not on file       Current Outpatient Medications   Medication Sig Dispense Refill    pregabalin (Lyrica) 50 mg capsule Take 1 cap by mouth in the morning and 2 at night as directed. 90 Capsule 1    methylPREDNISolone (MEDROL DOSEPACK) 4 mg tablet Per dose pack instructions 1 Dose Pack 1    levothyroxine (Euthyrox) 150 mcg tablet Take 1 Tablet by mouth Daily (before breakfast). 30 Tablet 5    metFORMIN (GLUCOPHAGE) 500 mg tablet Take 1 tablet by mouth once daily with breakfast 30 Tablet 5    lidocaine 4 % patch Apply daily (Patient not taking: Reported on 9/26/2022) 30 Each 3    methocarbamoL (ROBAXIN) 500 mg tablet Take 1 Tablet by mouth two (2) times daily as needed for Muscle Spasm(s). (Patient not taking: Reported on 9/26/2022) 40 Tablet 3       Allergies   Allergen Reactions    Pcn [Penicillins] Hives       REVIEW OF SYSTEMS    Constitutional: Negative for fever, chills, or weight change. Respiratory: Negative for cough or shortness of breath. Cardiovascular: Negative for chest pain or palpitations. Gastrointestinal: Negative for acid reflux, change in bowel habits, or constipation. Genitourinary: Negative for dysuria and flank pain.    Musculoskeletal: Positive for lumbar and bilateral leg pain. Skin: Negative for rash. Neurological: Negative for headaches, dizziness, or numbness. Endo/Heme/Allergies: Negative for increased bruising. Psychiatric/Behavioral: Negative for difficulty with sleep. As per HPI    PHYSICAL EXAMINATION  Visit Vitals  /77 (BP 1 Location: Right arm, BP Patient Position: Sitting, BP Cuff Size: Adult)   Pulse 65   Temp 97.7 °F (36.5 °C) (Temporal)   Resp 16   Ht 5' 9\" (1.753 m)   Wt 167 lb (75.8 kg)   SpO2 100%   BMI 24.66 kg/m²       Constitutional: Awake, alert, and in no acute distress. HEENT: Normocephalic. Atraumatic. Oropharynx is moist and clear. PERRL. EOMI. Sclerae are nonicteric  Cardiovascular: Regular rate and rhythm  Lungs: Clear to auscultation bilaterally  Abdomen: Soft and nontender. Bowel sounds are present  Neurological: 1+ symmetrical DTRs in the upper extremities. 1+ symmetrical DTRs in the lower extremities. Sensation to light touch is intact. Negative Hall's sign bilaterally. Skin: warm, dry, and intact. Musculoskeletal: No pain with extension, axial loading, or forward flexion. No pain with internal or external rotation of his hips. Positive straight leg raise on the right. No pain with heel or toe walking. Difficulty with the single leg stance on the right.       Biceps  Triceps Deltoids Wrist Ext Wrist Flex Hand Intrin   Right +4/5 +4/5 +4/5 +4/5 +4/5 +4/5   Left +4/5 +4/5 +4/5 +4/5 +4/5 +4/5      Hip Flex  Quads Hamstrings Ankle DF EHL Ankle PF   Right +4/5 +4/5 +4/5 +4/5 +4/5 +4/5   Left +4/5 +4/5 +4/5 +4/5 +4/5 +4/5     IMAGING:    Lumbar spine MRI from 08/11/2022 was personally reviewed with the patient and demonstrated:    MRI Results (most recent):  Results from Hospital Encounter encounter on 08/11/22    MRI LUMB SPINE W WO CONT    Narrative  EXAM: MRI LUMB SPINE W WO CONT    CLINICAL INDICATIONS/HISTORY: Lower back pain, pain bilateral lower extremities    COMPARISON: None    TECHNIQUE: Multi-sequence multiplanar pre and postcontrast imaging obtained  centered on the lumbar spine. Contrast used: 20 cc Dotarem    FINDINGS:    Alignment: Intact lordosis  Vertebral body height: Normal  Marrow signal: Unremarkable  Disc spaces: Preserved height and signal intensity  Conus: Terminates at T12-L1    Axial imaging correlation:        L1-2: Patent canal and foramina. L2-3: Patent canal and foramina. L3-4: Patent canal and foramina. L4-5: Moderate intradiscal degenerative changes with small amount of radial  fissuring in the midline    small central protrusion-type herniation    Central canal is nonstenotic    Right and left neuroforamina exiting nerve roots not compressed, facets appear  normal    L5-S1: central and slightly right-sided moderate-sized protrusion herniation  exerting mild mass effect on the right and left S1 nerve roots    Central canal is nonstenotic    Right and left neuroforamina exiting nerve roots not compressed    Other structures: Unremarkable. Impression  Herniations at L4-L5 L5-S1 with mild mass effect on right and left S1 nerve  roots    Written by Lissett Cunningham, as dictated by Juanpablo Mcintosh MD.  I, Dr. Juanpablo Mcintosh confirm that all documentation is accurate.

## 2022-09-26 ENCOUNTER — OFFICE VISIT (OUTPATIENT)
Dept: ORTHOPEDIC SURGERY | Age: 52
End: 2022-09-26
Payer: COMMERCIAL

## 2022-09-26 VITALS
SYSTOLIC BLOOD PRESSURE: 115 MMHG | HEIGHT: 69 IN | BODY MASS INDEX: 24.73 KG/M2 | HEART RATE: 65 BPM | RESPIRATION RATE: 16 BRPM | TEMPERATURE: 97.7 F | DIASTOLIC BLOOD PRESSURE: 77 MMHG | OXYGEN SATURATION: 100 % | WEIGHT: 167 LBS

## 2022-09-26 DIAGNOSIS — M48.061 FORAMINAL STENOSIS OF LUMBAR REGION: ICD-10-CM

## 2022-09-26 DIAGNOSIS — M54.16 LUMBAR RADICULITIS: ICD-10-CM

## 2022-09-26 DIAGNOSIS — M62.838 MUSCLE SPASM: ICD-10-CM

## 2022-09-26 DIAGNOSIS — Z72.0 TOBACCO USE: ICD-10-CM

## 2022-09-26 DIAGNOSIS — M51.36 ANNULAR TEAR OF LUMBAR DISC: Primary | ICD-10-CM

## 2022-09-26 PROCEDURE — 99203 OFFICE O/P NEW LOW 30 MIN: CPT | Performed by: PHYSICAL MEDICINE & REHABILITATION

## 2022-09-26 RX ORDER — METHYLPREDNISOLONE 4 MG/1
TABLET ORAL
Qty: 1 DOSE PACK | Refills: 1 | Status: SHIPPED | OUTPATIENT
Start: 2022-09-26

## 2022-09-26 RX ORDER — PREGABALIN 50 MG/1
CAPSULE ORAL
Qty: 90 CAPSULE | Refills: 1 | Status: SHIPPED | OUTPATIENT
Start: 2022-09-26

## 2022-09-26 NOTE — PATIENT INSTRUCTIONS
Herniated Disc: Exercises  Introduction  Here are some examples of exercises for you to try. The exercises may be suggested for a condition or for rehabilitation. Start each exercise slowly. Ease off the exercises if you start to have pain. You will be told when to start these exercises and which ones will work best for you. How to stay safe  These exercises can help you move easier and feel better. But when you first start doing them, you may have more pain in your back. This is normal. But it is important to pay close attention to your pain during and after each exercise. Keep doing these exercises if your pain stays the same or moves from your leg and buttock more toward the middle of your spine. Pain moving out of your leg and buttock is a good sign. Stop doing these exercises if your pain gets worse in your leg and buttock. Stop if you start to have pain in your leg and buttock that you didn't have before. Be sure to do these exercises in the order they appear. Note how your pain changes before you move to the next one. If your pain is much worse right after exercise and stays worse the next day, do not do any of these exercises. How to do the exercises  1. Rest on belly    Lie on your stomach, with your head turned to the side. Try to relax your lower back muscles as much as you can. Continue to lie on your stomach for 2 minutes. Keep your arms beside your body. If that position bothers your neck, place your hands, one on top of the other, underneath your forehead. This will help support your head and neck. If lying in this position causes or increases pain down your leg, stop this exercise and do not do the next exercises. 2. Press-up back extension    Lie on your stomach, with your face down and your elbows tucked into your sides and under your shoulders. Press your elbows down into the floor to raise your upper back. Hold this position for 15 to 30 seconds. Repeat 2 to 4 times.   As you do this, relax your stomach muscles and allow your back to arch without using your back muscles. Let your low back relax completely as you arch up. Don't let your hips or pelvis come off the floor. Then relax, and return to the start position. Over time, work up to staying in the press-up position for up to 2 minutes. If lying in this position causes or increases pain down your leg, stop this exercise and do not do the next exercises. 3. Full press-up back extension    Lie on your stomach with your face down, keeping your elbows tucked into your sides and under your shoulders. Straighten your elbows, and push your upper body up as far as you can. Hold this position for 5 seconds, and then relax. Repeat 10 times. Allow your lower back to sag. Keep your hips, pelvis, and legs relaxed. Each time, try to raise your upper body a little higher and hold your arms a bit straighter. If lying in this position causes or increases pain down your leg, stop this exercise and do not do the next exercises. If you can't do this exercise, you may instead try the backward bend exercise that follows. 4. Backward bend    Stand with your feet hip-width apart, and don't lock your knees. Place your hands in the small of your back. Bend backward as far as you can, keeping your knees straight. Repeat 2 to 4 times. Your toes should be pointing forward. Hold this position for 2 to 3 seconds. Then return to your starting position. Each time, try to bend backward a little farther, until you bend backward as far as you can. If standing in this position causes or increases pain down your leg, stop this exercise. Follow-up care is a key part of your treatment and safety. Be sure to make and go to all appointments, and call your doctor if you are having problems. It's also a good idea to know your test results and keep a list of the medicines you take. Where can you learn more?   Go to http://www.gray.com/  Enter I223 in the search box to learn more about \"Herniated Disc: Exercises. \"  Current as of: July 1, 2021               Content Version: 13.2  © 2006-2022 [employer]. Care instructions adapted under license by [employer] (which disclaims liability or warranty for this information). If you have questions about a medical condition or this instruction, always ask your healthcare professional. [place] [address] any warranty or liability for your use of this information. Low Back Arthritis: Exercises  Introduction  Here are some examples of typical rehabilitation exercises for your condition. Start each exercise slowly. Ease off the exercise if you start to have pain. Your doctor or physical therapist will tell you when you can start these exercises and which ones will work best for you. When you are not being active, find a comfortable position for rest. Some people are comfortable on the floor or a medium-firm bed with a small pillow under their head and another under their knees. Some people prefer to lie on their side with a pillow between their knees. Don't stay in one position for too long. Take short walks (10 to 20 minutes) every 2 to 3 hours. Avoid slopes, hills, and stairs until you feel better. Walk only distances you can manage without pain, especially leg pain. How to do the exercises  Pelvic tilt    Lie on your back with your knees bent. \"Brace\" your stomach--tighten your muscles by pulling in and imagining your belly button moving toward your spine. Press your lower back into the floor. You should feel your hips and pelvis rock back. Hold for 6 seconds while breathing smoothly. Relax and allow your pelvis and hips to rock forward. Repeat 8 to 12 times. Back stretches    Get down on your hands and knees on the floor. Relax your head and allow it to droop.  Round your back up toward the ceiling until you feel a nice stretch in your upper, middle, and lower back. Hold this stretch for as long as it feels comfortable, or about 15 to 30 seconds. Return to the starting position with a flat back while you are on your hands and knees. Let your back sway by pressing your stomach toward the floor. Lift your buttocks toward the ceiling. Hold this position for 15 to 30 seconds. Repeat 2 to 4 times. Follow-up care is a key part of your treatment and safety. Be sure to make and go to all appointments, and call your doctor if you are having problems. It's also a good idea to know your test results and keep a list of the medicines you take. Where can you learn more? Go to http://www.millan.com/  Enter T094 in the search box to learn more about \"Low Back Arthritis: Exercises. \"  Current as of: July 1, 2021               Content Version: 13.2  © 2006-2022 [employer]. Care instructions adapted under license by [employer] (which disclaims liability or warranty for this information). If you have questions about a medical condition or this instruction, always ask your healthcare professional. [place] [address] any warranty or liability for your use of this information.

## 2022-09-26 NOTE — LETTER
WORK NOTE    9/26/2022 4:13 PM    Mr. Pamela Navarro  [address] [address]      To Whom It May Concern:    Pamela Navarro is currently under the care of [facility]. Pt has a weight limit of 35 lbs, is to avoid bending, stooping, and lifting, and needs to be allowed to change positions as needed. If there are questions or concerns please have the patient contact our office.         Sincerely,      Lacey Martines MD

## 2022-09-26 NOTE — Clinical Note
NOTIFICATION RETURN TO WORK / SCHOOL    9/26/2022 4:16 PM    Mr. Stephanie Segal  [address] [address]      To Whom It May Concern:    Stephanie Segal is currently under the care of [facility]. He will return to work/school on: ***    If there are questions or concerns please have the patient contact our office.         Sincerely,      Cleo Rizzo MD

## 2022-09-26 NOTE — PROGRESS NOTES
Chief Complaint   Patient presents with    New Patient       Pt preferred language for health care discussion is english. Is someone accompanying this pt? no    Is the patient using any DME equipment during [address]? no    Depression Screening:  3 most recent [facility] 36 Screens 2/23/2022 5/24/2021 2/23/2021 9/23/2020   Little interest or pleasure in doing things Not at all Not at all Not at all Not at all   Feeling down, depressed, irritable, or hopeless Not at all Not at all Not at all Not at all   Total Score PHQ 2 0 0 0 0       Learning Assessment:  Learning Assessment 9/23/2020   PRIMARY LEARNER Patient   PRIMARY LANGUAGE ENGLISH   LEARNER PREFERENCE PRIMARY DEMONSTRATION   ANSWERED BY patient   RELATIONSHIP SELF         Health Maintenance reviewed and discussed per provider. Yes        Advance Directive:  1. Do you have an advance directive in place? Patient Reply:no    2. If not, would you like material regarding how to put one in place? Patient Reply: no    Coordination of Care:  1. Have you been to the ER, urgent care clinic since your last visit? Hospitalized since your last visit? Yes mariajose hahn    2. Have you seen or consulted any other health care providers outside of the [address] [place] [address] since your last visit? Include any pap smears or colon screening.  no

## 2022-09-27 DIAGNOSIS — E03.9 HYPOTHYROIDISM, UNSPECIFIED TYPE: ICD-10-CM

## 2022-09-27 DIAGNOSIS — E11.65 TYPE 2 DIABETES MELLITUS WITH HYPERGLYCEMIA, WITHOUT LONG-TERM CURRENT USE OF INSULIN (HCC): ICD-10-CM

## 2022-09-28 RX ORDER — LEVOTHYROXINE SODIUM 150 UG/1
TABLET ORAL
Qty: 30 TABLET | Refills: 0 | Status: SHIPPED | OUTPATIENT
Start: 2022-09-28

## 2022-09-28 RX ORDER — METFORMIN HYDROCHLORIDE 500 MG/1
TABLET ORAL
Qty: 30 TABLET | Refills: 0 | Status: SHIPPED | OUTPATIENT
Start: 2022-09-28

## 2022-10-25 ENCOUNTER — HOSPITAL ENCOUNTER (OUTPATIENT)
Dept: PHYSICAL THERAPY | Age: 52
Discharge: HOME | End: 2022-10-25
Payer: COMMERCIAL

## 2022-10-25 PROCEDURE — 97162 PT EVAL MOD COMPLEX 30 MIN: CPT

## 2022-10-25 NOTE — PROGRESS NOTES
PT DAILY TREATMENT NOTE/LUMBAR EVAL     Patient Name: Jorge Alonso  Date:10/25/2022  : 1970  [x]  Patient  Verified  Payor: María Durand / Plan: [address] / Product Type: Managed Care Medicaid /    In time:1445  Out time:1535  Total Treatment Time (min): 50  Visit #: 1 of 4-8    Medicare/BCBS Only   Total Timed Codes (min):  50 1:1 Treatment Time:  50     Treatment Area: Other low back pain [M54.59]     SUBJECTIVE    CC: LBP   Pain: Current: 8/10 Best: -5/10 Worst: 10/10   Alleviated By: laying supine on the floor, meds   Aggravated By: standing, sitting, walking  PLOF: Independent  Limitations to PLOF: Stairs  Recent Falls: No  Mechanism of Injury: Began ~2 yrs ago, worsening this year. Tweaked it at work, kept working, helping someone down steps in a wheelchair, something popped, hasnt been the same since. Current symptoms/Complaints: Constant pain shooting down both legs, difficult to walk. Varies between B and R and L LE. Sharp, numbing shooting from top of butt to thighs, sometimes do LEs. Previous Treatment/Compliance: Denies  PMHx/Surgical Hx: None  Diagnostic Tests: [] Lab work [] X-rays    [] CT [x] MRI     [] Other:   Results: see chart  Work Hx: Full-time employed,   Pt Goals: \"relief\"      50 min [x]Eval                  []Re-Eval             Throughout Patient Education:   Pt educated on diagnosis and prognosis, current impairments, postural re-education, goals/role/intent of therapy, and importance of compliance with HEP. Written HEP provided to patient and PT instructed pt on performance. Pt demo good understanding and recall. PT addressed pt questions/concerns regarding their condition.      Discussed the following activity modifications:  - Avoid performing provocative movements and activities  - NO BLT  - Ergonomics for sleep positioning and use of pillows    Handout for lumbar disc herniations and proper lifting techniques         General Health:  Red Flags Indicated? [x] Yes    [] No  [] Yes [x] No Recent weight change  [x] Yes [] No Weakness in legs during walking  [x] Yes [] No Unremitting pain at night, sometimes  [] Yes [x] No Abdominal pain or problems  [] Yes [x] No Feet more cold or painful in cold weather  [] Yes [x] No Dysfunction of bowel or bladder  [] Yes [x] No Numbness/tingling in buttock/genitalia region      OBJECTIVE    Posture:  (-) lateral shift    Active Movements:   ROM % AROM Comments:pain, area   Forward flexion  To knees Pain at R HS    Extension 50 Pain at R buttock   SB R  To knee  Pain at R buttock   SB L  To knee    Rotation R  75    Rotation L  75       ROM / Strength:                     Strength (1-5)    Left Right   Hip Flexion 4+ 5    Extension 4- 4-    Abduction 3+ 3+   Knee Flexion 5 4 p! Extension 5 4 p! Ankle Plantarflexion      Dorsiflexion 5 4+     Hip, Knee AROM: WNL B    Repeated Movements:   Extension: Decr  Flexion: Incr     Neuro Screen:   Intact to Light touch L3-S1 B  Reflexes:  L4: 2+ B  S1: 2+ B    Dural Mobility:  SLR Supine: ( +)  B  Slump Test: (- ) B  Prone Knee Bend: (+ )  B    Palpation:  TTP at B glutes, l/s paraspinals  Iliac crests: Even height B        Flexibility:   Gastoc:  Mod limitation B  Hamstring 90/90: Mod limitation B  Piriformis: (+) B    Balance:  SLS: R: unable;  L: 20 sec    Other Tests/Comments:  TA draw: good  5xSTS: 20 seconds, use of B UE on plinth, painful  FOTO: NT      Pain Level (0-10 scale) post treatment: 7/10    ASSESSMENT/Changes in Function:   Please see POC    Patient will continue to benefit from skilled PT services to modify and progress therapeutic interventions, address functional mobility deficits, address ROM deficits, address strength deficits, analyze and address soft tissue restrictions, analyze and cue movement patterns, analyze and modify body mechanics/ergonomics, assess and modify postural abnormalities, address imbalance/dizziness, and instruct in home and community integration to attain remaining goals.      [x]  See Plan of Care   []  See progress note/recertification  []  See Discharge Summary         Progress towards goals / Updated goals:  Please see POC    Justification for Eval Code Complexity:  Patient History : see above  Examination: see exam  Clinical Presentation: evolving  Clinical Decision Making : 5xSTS: 20 sec      PLAN  [x]  Upgrade activities as tolerated     [x]  Continue plan of care  []  Update interventions per flow sheet       []  Discharge due to:_  [x]  Other:_   POC 1-2x/wk for 4 weeks    Shahnaz Uriostegui, PT 10/25/2022  8:01 AM

## 2022-11-02 ENCOUNTER — HOSPITAL ENCOUNTER (OUTPATIENT)
Dept: PHYSICAL THERAPY | Age: 52
End: 2022-11-02

## 2022-11-07 DIAGNOSIS — E03.9 HYPOTHYROIDISM, UNSPECIFIED TYPE: ICD-10-CM

## 2022-11-10 ENCOUNTER — APPOINTMENT (OUTPATIENT)
Dept: PHYSICAL THERAPY | Age: 52
End: 2022-11-10

## 2022-11-15 ENCOUNTER — APPOINTMENT (OUTPATIENT)
Dept: PHYSICAL THERAPY | Age: 52
End: 2022-11-15

## 2022-11-16 ENCOUNTER — VIRTUAL VISIT (OUTPATIENT)
Dept: FAMILY MEDICINE CLINIC | Age: 52
End: 2022-11-16
Payer: COMMERCIAL

## 2022-11-16 DIAGNOSIS — E11.65 TYPE 2 DIABETES MELLITUS WITH HYPERGLYCEMIA, WITHOUT LONG-TERM CURRENT USE OF INSULIN (HCC): ICD-10-CM

## 2022-11-16 DIAGNOSIS — Z12.5 PROSTATE CANCER SCREENING: ICD-10-CM

## 2022-11-16 DIAGNOSIS — E03.9 HYPOTHYROIDISM, UNSPECIFIED TYPE: Primary | ICD-10-CM

## 2022-11-16 PROCEDURE — 99213 OFFICE O/P EST LOW 20 MIN: CPT | Performed by: FAMILY MEDICINE

## 2022-11-16 RX ORDER — LEVOTHYROXINE SODIUM 150 UG/1
150 TABLET ORAL
Qty: 90 TABLET | Refills: 0 | Status: SHIPPED | OUTPATIENT
Start: 2022-11-16

## 2022-11-16 NOTE — PROGRESS NOTES
Chief Complaint   Patient presents with    Thyroid Problem     Patient being seen today for f/u on his thyroid and med refills no concerns. .  1. \"Have you been to the ER, urgent care clinic since your last visit? Hospitalized since your last visit? \"  Seen at the ER for back pain    2. \"Have you seen or consulted any other health care providers outside of the [address] [place] [address] since your last visit? \"  Has seen Dr. Loli Leblanc      3. For patients aged 39-70: Has the patient had a colonoscopy / FIT/ Cologuard? No      If the patient is female:    4. For patients aged 41-77: Has the patient had a mammogram within the past 2 years? NA - based on age or sex      11. For patients aged 21-65: Has the patient had a pap smear?  NA - based on age or sex

## 2022-11-16 NOTE — PROGRESS NOTES
Vinita Sutherland is a 46 y.o. male who was seen by synchronous (real-time) audio-video technology on 11/16/2022 for Thyroid Problem  No complaints. Assessment & Plan:   Diagnoses and all orders for this visit:    1. Hypothyroidism, unspecified type  -     levothyroxine (SYNTHROID) 150 mcg tablet; Take 1 Tablet by mouth Daily (before breakfast). -     TSH AND FREE T4; Future    2. Type 2 diabetes mellitus with hyperglycemia, without long-term current use of insulin (HCC)  -     HEMOGLOBIN A1C WITH EAG; Future    3. Prostate cancer screening  -     PSA SCREENING (SCREENING); Future        712  Subjective:       Prior to Admission medications    Medication Sig Start Date End Date Taking? Authorizing Provider   levothyroxine (SYNTHROID) 150 mcg tablet Take 1 Tablet by mouth Daily (before breakfast). 11/16/22  Yes Edilberto Downey MD   metFORMIN (GLUCOPHAGE) 500 mg tablet Take 1 tablet by mouth once daily with breakfast 9/28/22  Yes Edilberto Downey MD   pregabalin (Lyrica) 50 mg capsule Take 1 cap by mouth in the morning and 2 at night as directed. 9/26/22  Yes Danilo Mayorga MD   methocarbamoL (ROBAXIN) 500 mg tablet Take 1 Tablet by mouth two (2) times daily as needed for Muscle Spasm(s). 7/22/22  Yes Edilberto Downey MD   levothyroxine (SYNTHROID) 150 mcg tablet TAKE 1 TABLET BY MOUTH ONCE DAILY BEFORE BREAKFAST 9/28/22 11/16/22  Edilberto Downey MD   methylPREDNISolone (MEDROL DOSEPACK) 4 mg tablet Per dose pack instructions  Patient not taking: Reported on 11/16/2022 9/26/22 11/16/22  Danilo Mayorga MD   lidocaine 4 % patch Apply daily  Patient not taking: No sig reported 7/22/22   Edilberto Downey MD     There is no problem list on file for this patient. Past Medical History:   Diagnosis Date    Acquired underactive thyroid     DM (diabetes mellitus), type 2 ([address]        Review of Systems   Constitutional:  Negative for chills, fever, malaise/fatigue and weight loss.    Eyes:  Negative for blurred vision. Respiratory:  Negative for cough, shortness of breath and wheezing. Cardiovascular:  Negative for chest pain. Gastrointestinal:  Negative for nausea and vomiting. Musculoskeletal:  Negative for myalgias. Skin:  Negative for rash. Neurological:  Negative for weakness. Objective:   No flowsheet data found. General: alert, cooperative, no distress   Mental  status: normal mood, behavior, speech, dress, motor activity, and thought processes, able to follow commands   HENT: NCAT   Neck: no visualized mass   Resp: no respiratory distress   Neuro: no gross deficits   Skin: no discoloration or lesions of concern on visible areas   Psychiatric: normal affect, consistent with stated mood, no evidence of hallucinations     Additional exam findings: We discussed the expected course, resolution and complications of the diagnosis(es) in detail. Medication risks, benefits, costs, interactions, and alternatives were discussed as indicated. I advised him to contact the office if his condition worsens, changes or fails to improve as anticipated. He expressed understanding with the diagnosis(es) and plan. Julrina Koko, was evaluated through a synchronous (real-time) audio-video encounter. The patient (or guardian if applicable) is aware that this is a billable service, which includes applicable co-pays. This Virtual Visit was conducted with patient's (and/or legal guardian's) consent. The visit was conducted pursuant to the emergency declaration under the [address] [place] [address] [facility] authority and the [employer] General Act. Patient identification was verified, and a caregiver was present when appropriate. The patient was located at: Home: [address] [place] [address]  The provider was located at:  Facility (Appt Department): [address] [place] [address] Rd  [address] [facility] [address] [facility]        Arron Kee MD

## 2022-11-17 ENCOUNTER — APPOINTMENT (OUTPATIENT)
Dept: PHYSICAL THERAPY | Age: 52
End: 2022-11-17

## 2022-11-22 ENCOUNTER — APPOINTMENT (OUTPATIENT)
Dept: PHYSICAL THERAPY | Age: 52
End: 2022-11-22

## 2022-11-29 ENCOUNTER — APPOINTMENT (OUTPATIENT)
Dept: PHYSICAL THERAPY | Age: 52
End: 2022-11-29

## 2022-12-12 DIAGNOSIS — E11.65 TYPE 2 DIABETES MELLITUS WITH HYPERGLYCEMIA, WITHOUT LONG-TERM CURRENT USE OF INSULIN (HCC): ICD-10-CM

## 2022-12-12 RX ORDER — METFORMIN HYDROCHLORIDE 500 MG/1
500 TABLET ORAL
Qty: 30 TABLET | Refills: 0 | Status: SHIPPED | OUTPATIENT
Start: 2022-12-12

## 2022-12-13 NOTE — PROGRESS NOTES
[facility] AT [address]. Angie Thomas, [place], [place]  Phone: [phone] Fax 21 [phone] SUMMARY  Patient Name: Coni Carlton : 1970   Treatment/Medical Diagnosis: Other low back pain [M54.59]   Referral Source: Anitha Atkins MD     Date of Initial Visit: 10/25/22 Attended Visits: 1 Missed Visits: 2     SUMMARY OF TREATMENT  Pt is a 47 yo male presenting with LBP with radiculopathy in the sciatic nerve distribution B. MRI taken 22 reveals \"Herniations at L4-L5 L5-S1 with mild mass effect on right and left S1 nerve roots\". Treatment plan has consisted of evaluation, HEP, Patient education, Functional mobility training, and Self Care training. CURRENT STATUS  Pt attended 1 skilled Physical Therapy appointments including Initial Evaluation. Pt failed to attend remaining scheduled appointments and therefore is being discharged from our services. Please send new consult if further therapy should be indicated. Progress towards goals: Did not meet therapeutic goals d/t lack of compliance with attendance. RECOMMENDATIONS  Discontinue therapy due to lack of attendance or compliance. If you have any questions/comments please contact us directly at [phone]. Thank you for allowing us to assist in the care of your patient. To ensure we are able to process the patients encounter and avoid risk of your patient receiving a bill for our services, please sign and return this discharge summary by 2023. (30days following DC date)    Therapist Signature: Sherman Bettencourt PT Date: 22   Reporting Period: 10/25/22 - 22 Time: 5:57 PM   NOTE TO PHYSICIAN:  Your patient's insurance requires this discharge note be signed and returned.   PLEASE COMPLETE THE ORDERS BELOW AND RETURN TO:  [facility]    ___ I have read the above report and request that my patient be discharged from therapy.      Physician Signature:        Date:       Time:                                        Manasa Giang MD

## 2023-01-10 ENCOUNTER — OFFICE VISIT (OUTPATIENT)
Dept: ORTHOPEDIC SURGERY | Age: 53
End: 2023-01-10
Payer: COMMERCIAL

## 2023-01-10 DIAGNOSIS — M54.16 LUMBAR RADICULITIS: Primary | ICD-10-CM

## 2023-01-10 PROCEDURE — 99213 OFFICE O/P EST LOW 20 MIN: CPT | Performed by: PHYSICAL MEDICINE & REHABILITATION

## 2023-01-10 NOTE — PROGRESS NOTES
[address]  [address], [address]  [address]  Phone: [phone]  Fax: [phone]      Patient: Rishi Valiente                                                                              MRN: [id number]        YOB: 1970          AGE: 46 y.o. PCP: Shantel Chang MD  Date:  01/10/23    Reason for Consultation: Back Pain      HPI:  Rishi Valiente is a 46 y.o. male with relevant PMH of Grave's Disease, DM who presents with low back pain which began after an injury at work in [address] [facility]. He reports pain began around July 2021. He has been seeing Dr. Robbie Guzman who got an MRI of his lumbar spine 8/2022 which demonstrates small L4/5 disc bulge and moderate L5/S1 disc bulge with mild mass effect on the R> left S1 nerve. He was referred to PT but due to his schedule was only able to go to one session. He started lyrica 50mg but found it made him too drowsy. Neurologic symptoms: + numbness, tingling, No weakness, bowel or bladder changes. No recent falls      Location: The pain is located in the low back pain   Radiation: The pain does radiate R>L. Pain Score: Currently: 8/10    Quality: Pain is of a Achy, Burning, Stabbing, Tingling, Numbness, and Tight quality. Aggravating: Pain is exacerbated by walking, sitting, standing, and lying down- prolonged  Alleviating:  The pain is alleviated by  changing positions    Prior Treatments:  Physical therapy: NO  Injections:NO    Previous Medications:   Current Medications: lyrica 50mg at night, robaxin  Previous work-up has included:     MRI Results (most recent):  Results from Hospital Encounter encounter on 08/11/22    MRI LUMB SPINE W WO CONT    Narrative  EXAM: MRI LUMB SPINE W WO CONT    CLINICAL INDICATIONS/HISTORY: Lower back pain, pain bilateral lower extremities    COMPARISON: None    TECHNIQUE: Multi-sequence multiplanar pre and postcontrast imaging obtained  centered on the lumbar spine. Contrast used: 20 cc Dotarem    FINDINGS:    Alignment: Intact lordosis  Vertebral body height: Normal  Marrow signal: Unremarkable  Disc spaces: Preserved height and signal intensity  Conus: Terminates at T12-L1    Axial imaging correlation:        L1-2: Patent canal and foramina. L2-3: Patent canal and foramina. L3-4: Patent canal and foramina. L4-5: Moderate intradiscal degenerative changes with small amount of radial  fissuring in the midline    small central protrusion-type herniation    Central canal is nonstenotic    Right and left neuroforamina exiting nerve roots not compressed, facets appear  normal    L5-S1: central and slightly right-sided moderate-sized protrusion herniation  exerting mild mass effect on the right and left S1 nerve roots    Central canal is nonstenotic    Right and left neuroforamina exiting nerve roots not compressed    Other structures: Unremarkable. Impression  Herniations at L4-L5 L5-S1 with mild mass effect on right and left S1 nerve  roots        Past Medical History:   Past Medical History:   Diagnosis Date    Acquired underactive thyroid     DM [facility]di[facility]       Past Surgical History: History reviewed. No pertinent surgical history. SocHx:   Social History     Tobacco Use    Smoking status: Every Day     Packs/day: 0.50     Types: Cigars, Cigarettes     Passive exposure: Never    Smokeless tobacco: Never   Substance Use Topics    Alcohol use: Not Currently      FamHx:? History reviewed. No pertinent family history. Current Medications:    Current Outpatient Medications   Medication Sig Dispense Refill    metFORMIN (GLUCOPHAGE) 500 mg tablet Take 1 Tablet by mouth daily (with breakfast). 30 Tablet 0    levothyroxine (SYNTHROID) 150 mcg tablet Take 1 Tablet by mouth Daily (before breakfast).  90 Tablet 0    pregabalin (Lyrica) 50 mg capsule Take 1 cap by mouth in the morning and 2 at night as directed. 90 Capsule 1    methocarbamoL (ROBAXIN) 500 mg tablet Take 1 Tablet by mouth two (2) times daily as needed for Muscle Spasm(s). 40 Tablet 3    lidocaine 4 % patch Apply daily (Patient not taking: No sig reported) 30 Each 3      Allergies: Allergies   Allergen Reactions    Pcn [Penicillins] Hives        Review of Systems:   Gen:    Denied fevers, chills, malaise, fatigue, weight changes   Resp: Denied shortness of breath, cough, wheezing   CVS: Denied chest pain, palpitations   : Denied urinary urgency, frequency, incontinence   GI: Denied nausea, vomiting, constipation, diarrhea   Skin: Denied rashes, wounds   Psych: Denied anxiety, depression   Vasc: Denied claudication, ulcers   Hem: Denied easy bruising/bleeding   MSK: See HPI   Neuro: See HPI         Physical Exam     Vital Signs: There were no vitals taken for this visit. General: ??????? Well nourished and well developed male without any acute distress   Psychiatric: ?  Alert and oriented x 3 with normal mood    HEENT: ???????? Atraumatic   Respiratory:   Breathing non-labored and non dyspneic   CV: ???????????????? Peripheral pulses intact, no peripheral edema   Skin: ????????????? No rashes       Neurologic: ?? Sensation: normal and grossly intact thebilateral, lower extremity(s)   Strength: 5/5 in the bilateral, lower extremity(s)  Reflexes: reveals 2+ symmetric DTRs throughout LE  Gait: normal     Musculoskeletal: Lumbar Exam     Inspection:   Alignment: Normal  Atrophy: None       Tenderness to Palpation:   Lumbar paraspinals Positive  Lumbar spinous processes Negative  SI Joint:  Negative  Gluteal:Negative   Greater trochanter: Negative  IT Band:Negative    ROM:   Lumbar ROM: Abnormal pain with flexion  Lumbar facet loading: Negative  Hip ROM: No reproduction of pain with movement     Special Tests      Slump test: Positive right           Medical Decision Making:    Images:  The imaging results as well as the actual images of the studies below were reviewed, visualized and interpreted by me. Labs: The results below were reviewed. MRI lumbar spine 8/11/22- images reviewed with patient with L4/5 DDD and disc bulge, L5/S1 disc bulge with bilateral lateral recess narrowing     Assessment:   - low back pain radiating down b/l legs R>L    Plan:      -Physical therapy -  exercises provided to start at home  -Medications - d/c lyrica made him drowsy. Counseled regarding side effects and appropriate administration of medications.    -Diagnostics/Imaging - MRI lumbar spine reviewed   -Injections - Referral to try bilateral S1 radiculopathy   -Lifestyle - Discussed activities to avoid   -Education - The patient's diagnosis, prognosis and treatment options were discussed today. All questions were answered.     F/U - after [address] [facility]

## 2023-01-10 NOTE — Clinical Note
Referral for [facility], patient would like to stay in [facility]  thanks

## 2023-01-16 ENCOUNTER — TELEPHONE (OUTPATIENT)
Dept: ORTHOPEDIC SURGERY | Age: 53
End: 2023-01-16

## 2023-01-27 RX ORDER — LEVOTHYROXINE SODIUM 150 UG/1
TABLET ORAL
Qty: 30 TABLET | Refills: 0 | OUTPATIENT
Start: 2023-01-27

## 2023-01-30 DIAGNOSIS — E03.9 HYPOTHYROIDISM, UNSPECIFIED TYPE: ICD-10-CM

## 2023-01-30 DIAGNOSIS — E11.65 TYPE 2 DIABETES MELLITUS WITH HYPERGLYCEMIA, WITHOUT LONG-TERM CURRENT USE OF INSULIN (HCC): ICD-10-CM

## 2023-01-30 RX ORDER — LEVOTHYROXINE SODIUM 150 UG/1
150 TABLET ORAL
Qty: 90 TABLET | Refills: 0 | Status: SHIPPED | OUTPATIENT
Start: 2023-01-30

## 2023-01-30 RX ORDER — METFORMIN HYDROCHLORIDE 500 MG/1
500 TABLET ORAL
Qty: 30 TABLET | Refills: 0 | Status: SHIPPED | OUTPATIENT
Start: 2023-01-30

## 2023-01-30 NOTE — TELEPHONE ENCOUNTER
This patient contacted office for the following prescriptions to be filled:    Last office visit: 11/16/22  Follow up appointment: no upcoming appt (if overdue call patient to schedule appointment)  Medication requested :   Requested Prescriptions     Pending Prescriptions Disp Refills    levothyroxine (SYNTHROID) 150 mcg tablet 90 Tablet 0     Sig: Take 1 Tablet by mouth Daily (before breakfast). metFORMIN (GLUCOPHAGE) 500 mg tablet 30 Tablet 0     Sig: Take 1 Tablet by mouth daily (with breakfast). PCP: Dyllan Crenshaw   Mail order or Local pharmacy name   [facility]         Pt states he is completely out of the medication listed above. Please review and advise as needed.

## 2023-03-10 ENCOUNTER — TELEPHONE (OUTPATIENT)
Facility: CLINIC | Age: 53
End: 2023-03-10

## 2023-03-10 DIAGNOSIS — E03.9 HYPOTHYROIDISM, UNSPECIFIED TYPE: Primary | ICD-10-CM

## 2023-03-10 DIAGNOSIS — E11.65 TYPE 2 DIABETES MELLITUS WITH HYPERGLYCEMIA, UNSPECIFIED WHETHER LONG TERM INSULIN USE (HCC): ICD-10-CM

## 2023-03-10 NOTE — TELEPHONE ENCOUNTER
This patient contacted office for the following prescriptions to be filled:    Medication requested : levothyroxine (SYNTHROID) 150 MCG tablet       metFORMIN (GLUCOPHAGE) 500 MG tablet     PCP: Marco Simeon or Print:   Mail order or Local pharmacy    Scheduled appointment if not seen by current providers in office: LOV: 22 [id number]      Pt states he has been without medication for a couple of days now which has made him feel very \"off\". Pt has been scheduled for soonest available that fits his work schedule. Please review and advise as soon as possible.

## 2023-03-14 ENCOUNTER — TELEPHONE (OUTPATIENT)
Facility: CLINIC | Age: 53
End: 2023-03-14

## 2023-03-14 RX ORDER — LEVOTHYROXINE SODIUM 0.15 MG/1
150 TABLET ORAL
Qty: 30 TABLET | Refills: 0 | Status: SHIPPED | OUTPATIENT
Start: 2023-03-14

## 2023-03-14 NOTE — TELEPHONE ENCOUNTER
Patient call back today regarding a temporary supply of his metformin. Please review and advise. Patient is schedule for 04/12/23.

## 2023-04-12 ENCOUNTER — TELEMEDICINE (OUTPATIENT)
Facility: CLINIC | Age: 53
End: 2023-04-12

## 2023-04-12 DIAGNOSIS — Z91.199 NO-SHOW FOR APPOINTMENT: Primary | ICD-10-CM

## 2023-04-12 ASSESSMENT — PATIENT HEALTH QUESTIONNAIRE - PHQ9
8. MOVING OR SPEAKING SO SLOWLY THAT OTHER PEOPLE COULD HAVE NOTICED. OR THE OPPOSITE, BEING SO FIGETY OR RESTLESS THAT YOU HAVE BEEN MOVING AROUND A LOT MORE THAN USUAL: 0
SUM OF ALL RESPONSES TO PHQ9 QUESTIONS 1 & 2: 3
SUM OF ALL RESPONSES TO PHQ QUESTIONS 1-9: 9
1. LITTLE INTEREST OR PLEASURE IN DOING THINGS: 0
10. IF YOU CHECKED OFF ANY PROBLEMS, HOW DIFFICULT HAVE THESE PROBLEMS MADE IT FOR YOU TO DO YOUR WORK, TAKE CARE OF THINGS AT HOME, OR GET ALONG WITH OTHER PEOPLE: 1
4. FEELING TIRED OR HAVING LITTLE ENERGY: 3
3. TROUBLE FALLING OR STAYING ASLEEP: 3
SUM OF ALL RESPONSES TO PHQ QUESTIONS 1-9: 9
6. FEELING BAD ABOUT YOURSELF - OR THAT YOU ARE A FAILURE OR HAVE LET YOURSELF OR YOUR FAMILY DOWN: 0
2. FEELING DOWN, DEPRESSED OR HOPELESS: 3
5. POOR APPETITE OR OVEREATING: 0
SUM OF ALL RESPONSES TO PHQ QUESTIONS 1-9: 9
SUM OF ALL RESPONSES TO PHQ QUESTIONS 1-9: 9
7. TROUBLE CONCENTRATING ON THINGS, SUCH AS READING THE NEWSPAPER OR WATCHING TELEVISION: 0
9. THOUGHTS THAT YOU WOULD BE BETTER OFF DEAD, OR OF HURTING YOURSELF: 0

## 2023-05-15 DIAGNOSIS — E11.65 TYPE 2 DIABETES MELLITUS WITH HYPERGLYCEMIA, UNSPECIFIED WHETHER LONG TERM INSULIN USE (HCC): ICD-10-CM

## 2023-05-15 DIAGNOSIS — E03.9 HYPOTHYROIDISM, UNSPECIFIED TYPE: ICD-10-CM

## 2023-05-16 RX ORDER — LEVOTHYROXINE SODIUM 0.15 MG/1
TABLET ORAL
Qty: 30 TABLET | Refills: 0 | Status: SHIPPED | OUTPATIENT
Start: 2023-05-16

## 2023-06-08 DIAGNOSIS — E11.65 TYPE 2 DIABETES MELLITUS WITH HYPERGLYCEMIA, UNSPECIFIED WHETHER LONG TERM INSULIN USE (HCC): ICD-10-CM

## 2023-06-08 DIAGNOSIS — E03.9 HYPOTHYROIDISM, UNSPECIFIED TYPE: ICD-10-CM

## 2023-06-09 RX ORDER — LEVOTHYROXINE SODIUM 0.15 MG/1
TABLET ORAL
Qty: 30 TABLET | Refills: 1 | Status: SHIPPED | OUTPATIENT
Start: 2023-06-09

## 2023-06-09 NOTE — TELEPHONE ENCOUNTER
This pharmacy faxed over request for the following prescriptions to be filled:    Medication requested :   Requested Prescriptions     Pending Prescriptions Disp Refills    levothyroxine (SYNTHROID) 150 MCG tablet [Pharmacy Med Name: Levothyroxine Sodium 150 MCG Oral Tablet] 30 tablet 0     Sig: TAKE 1 TABLET BY MOUTH ONCE DAILY BEFORE BREAKFAST    metFORMIN (GLUCOPHAGE) 500 MG tablet [Pharmacy Med Name: metFORMIN HCl 500 MG Oral Tablet] 30 tablet 0     Sig: Take 1 tablet by mouth once daily with breakfast      PCP: Dr. Stanley Bring or Print: [facility]    Mail order or Local pharmacy: [phone]    Scheduled appointment if not seen by current providers in office: LOV  11/16/22, pt is switching care to a [facility] PCP, I gave him the # to a few practices over there to call and switch PCP's. He stated he can not make it to [facility] and could only virtual but I let him know he does have to be seen in office to get vitals and that is when he decided to switch to [facility] PCP, asking for short term supply until he can get in with new PCP.

## 2023-09-13 DIAGNOSIS — E11.65 TYPE 2 DIABETES MELLITUS WITH HYPERGLYCEMIA, UNSPECIFIED WHETHER LONG TERM INSULIN USE (HCC): ICD-10-CM

## 2023-09-15 NOTE — TELEPHONE ENCOUNTER
Last filled 06/29/23 w/ 1rf, Patient was informed he would need to be seen for more refills, he was to switch to PCP in [place]. Was given numbers by manager to call.

## 2023-09-19 DIAGNOSIS — E11.65 TYPE 2 DIABETES MELLITUS WITH HYPERGLYCEMIA, UNSPECIFIED WHETHER LONG TERM INSULIN USE (HCC): ICD-10-CM

## 2023-09-22 NOTE — TELEPHONE ENCOUNTER
Patient was informed he would need to be seen for more refills, he was to switch to PCP in [facility]. Was given numbers by manager to call.

## 2023-11-17 ENCOUNTER — TELEPHONE (OUTPATIENT)
Facility: CLINIC | Age: 53
End: 2023-11-17

## 2023-11-17 NOTE — TELEPHONE ENCOUNTER
I called pt because he is overdue for follow up and was on A1c list.  Patient states he lives in [place] and cannot make it to our office. He asked for closer offices. He was given Luis Armando's # and [employer] as he said those were closest to him. I will remove Dr. Abdi Greene as PCP b/c pt stated he would not be following with us.

## 2024-01-29 RX ORDER — METHOCARBAMOL 500 MG/1
TABLET, FILM COATED ORAL
Qty: 40 TABLET | Refills: 0 | OUTPATIENT
Start: 2024-01-29

## 2024-11-06 ENCOUNTER — HOSPITAL ENCOUNTER (OUTPATIENT)
Facility: HOSPITAL | Age: 54
Setting detail: RECURRING SERIES
Discharge: HOME | End: 2024-11-09
Payer: COMMERCIAL

## 2024-11-06 PROCEDURE — 97161 PT EVAL LOW COMPLEX 20 MIN: CPT

## 2024-11-06 NOTE — PROGRESS NOTES
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 MRI revealed moderate disc herniation at L5/S1. He currently works in [employer] duties, [employer] floor, cleans, ect. This bothers him as well.     Pt reports his pain worsened over the past month. The pain used to be intermittent and now it's constant.      TREATMENT AREA =  Other low back pain [M54.59]      SUBJECTIVE  Pain Level (0-10 scale): 9/10  []constant []intermittent []improving [x]worsening []no change since onset  Low back throbbing, sharp pain, cramping radiating down into posterior thigh, stops at the knee, gets numb after a while with shooting  needles in the bottom of the toes.     Any medication changes, allergies to medications, adverse drug reactions, diagnosis change, or new procedure performed?: [x] No    [] Yes (see summary sheet for update)  Subjective functional status/changes:     PLOF: functionally independent, no AD, active lifestyle  Limitations to PLOF: prolonged standing, bending/lifting, squatting, stairs, prolonged sitting (15 minutes), walking for long periods of time (1/4 mile)  PMHx/Surgical Hx: grave's disease, type 2 diabetic  Work Hx: part time as    Living Situation: 1 FOS - L side going up   Pt Goals: \"to lessen the pain where I can work more hours, to feel better than what I am feeling instead of this constant daily pain\"  Barriers: []pain []financial []time [x]Transportation - needs to use access a ride []other  Motivation: excellent  Substance use: none   Cognition: A & O x 3        OBJECTIVE    30 min [x]Eval - untimed

## 2024-11-06 NOTE — PROGRESS NOTES
[facility]  [address] [place] [address] Phone: [phone] Fax   Plan of Care / Statement of Necessity for Physical Therapy Services     Patient Name: Alan Mitchell : 1970   Medical   Diagnosis: Other low back pain [M54.59] Treatment Diagnosis: Other low back pain [M54.59]   Onset Date: 10/2024 Payor: Payor: PAOLA / Plan: [facility] / Product Type: *No Product type* /    Referral Source: Emily Hernandez APRN* Start of Care (SOC): 2024   Prior Hospitalization: See medical history Provider #: [id number]   Prior Level of Function: Independent, active   Comorbidities: Other: grave's disease, type 2 diabetic   Medications: Verified on Patient Summary List     Assessment / key information:  55 yo male who presents to [facility] with c/o low back and BLE pain. Patient reports pain with prolonged standing, bending/lifting, squatting, stairs, prolonged sitting (15 minutes) and walking for long periods of time (1/4 mile). Patient demonstrates decreased ROM, likely decreased strength (unable to test 2/2 pain level), pain and decreased functional mobility tolerance.     Patient will benefit from skilled PT services to modify and progress therapeutic interventions, address functional mobility deficits, address ROM deficits, address strength deficits, analyze and address soft tissue restrictions, analyze and cue movement patterns, analyze and modify body mechanics/ergonomics, assess and modify postural abnormalities, address imbalance/dizziness, and instruct in home and community integration to attain remaining goals.    Evaluation Complexity:  History:  LOW Complexity : Zero comorbidities / personal factors that will impact the outcome / POC; Examination:  LOW Complexity : 1-2 Standardized tests and measures addressing body structure, function, activity limitation and / or participation in recreation

## 2024-11-19 ENCOUNTER — HOSPITAL ENCOUNTER (OUTPATIENT)
Facility: HOSPITAL | Age: 54
Setting detail: RECURRING SERIES
Discharge: HOME | End: 2024-11-22
Payer: MEDICAID

## 2024-11-19 PROCEDURE — G0283 ELEC STIM OTHER THAN WOUND: HCPCS

## 2024-11-19 PROCEDURE — 97112 NEUROMUSCULAR REEDUCATION: CPT

## 2024-11-19 PROCEDURE — 97110 THERAPEUTIC EXERCISES: CPT

## 2024-11-19 NOTE — PROGRESS NOTES
PHYSICAL / OCCUPATIONAL THERAPY - DAILY TREATMENT NOTE (updated )    Patient Name: Alan Mitchell    Date: 2024    : 1970  Insurance: Payor: [facility] MEDICAID / Plan: ER[facility] HEALTHKEEPERS PLUS / Product Type: *No Product type* /      Patient  verified YES    Visit #   Current / Total 2 16   Time   In / Out 1035 1127   Pain   In / Out 8 6-7   Subjective Functional Status/Changes: Pt reports being sore after last session. Reports only doing HEP x4  since evaluation.      TREATMENT AREA =  Other low back pain [M54.59]    Next PN/ RC due 24  Auth due 8th visit    OBJECTIVE    Estim Unattended (UNTIMED), with HEAT type/location: Right L/S     Min Rationale   15 decrease pain and increase tissue extensibility to improve patient's ability to progress to PLOF and address remaining functional goals.     Skin assessment post-treatment:   Intact     Therapeutic Procedures:  Tx Min Billable or 1:1 Min (if diff from Tx Min) Procedure, Rationale, Specifics   27  23125 Therapeutic Exercise (timed):  increase ROM, strength, coordination, balance, and proprioception to improve patient's ability to progress to PLOF and address remaining functional goals. (see flow sheet as applicable)     Details if applicable:    See flowsheet     2  04669 Therapeutic Activity (timed):  use of dynamic activities replicating functional movements to increase ROM, strength, coordination, balance, and proprioception in order to improve patient's ability to progress to PLOF and address remaining functional goals.  (see flow sheet as applicable)     Details if applicable:  [place]  See flow sheet     8  91750 Neuromuscular Re-Education (timed):  improve balance, coordination, kinesthetic sense, posture, core stability and proprioception to improve patient's ability to develop conscious control of individual muscles and awareness of position of extremities in order to progress to PLOF and address remaining functional

## 2024-11-21 ENCOUNTER — HOSPITAL ENCOUNTER (OUTPATIENT)
Facility: HOSPITAL | Age: 54
Setting detail: RECURRING SERIES
End: 2024-11-21
Payer: MEDICAID

## 2024-11-26 ENCOUNTER — APPOINTMENT (OUTPATIENT)
Facility: HOSPITAL | Age: 54
End: 2024-11-26
Payer: MEDICAID